# Patient Record
(demographics unavailable — no encounter records)

---

## 2025-07-08 NOTE — HISTORY OF PRESENT ILLNESS
[FreeTextEntry1] : Pt is a 26 yo female who presents for Rabies titer after booster dose of prophylaxis Rabies vaccine on 6/27/25. Rabies vaccine required for  school. Pt had prior low rabies vaccine level prior to booster.